# Patient Record
Sex: FEMALE
[De-identification: names, ages, dates, MRNs, and addresses within clinical notes are randomized per-mention and may not be internally consistent; named-entity substitution may affect disease eponyms.]

---

## 2020-04-23 ENCOUNTER — NURSE TRIAGE (OUTPATIENT)
Dept: OTHER | Facility: CLINIC | Age: 40
End: 2020-04-23

## 2020-04-23 NOTE — TELEPHONE ENCOUNTER
Call on COVID line:      Yesterday started with body aches, fever ( did not check), headache, pressure on her chest.     Today she is c/o of body aches, pressure on chest, when standing feels so tired, SOB. Intermittent chest pain- present now. Family history of heart disease. No cough noted. Protocol recommendation shared and care advice shared.      Reason for Disposition   Chest pain lasting longer than 5 minutes and ANY of the following:* Over 48years old* Over 27years old and at least one cardiac risk factor (i.e., high blood pressure, diabetes, high cholesterol, obesity, smoker or strong family history of heart disease)* Pain is crushing, pressure-like, or heavy * Took nitroglycerin and chest pain was not relieved* History of heart disease (i.e., angina, heart attack, bypass surgery, angioplasty, CHF)    Protocols used: CHEST PAIN-ADULT-OH